# Patient Record
Sex: MALE | Race: WHITE | Employment: FULL TIME | URBAN - METROPOLITAN AREA
[De-identification: names, ages, dates, MRNs, and addresses within clinical notes are randomized per-mention and may not be internally consistent; named-entity substitution may affect disease eponyms.]

---

## 2017-04-26 ENCOUNTER — OFFICE VISIT (OUTPATIENT)
Dept: FAMILY MEDICINE CLINIC | Facility: CLINIC | Age: 42
End: 2017-04-26

## 2017-04-26 VITALS
DIASTOLIC BLOOD PRESSURE: 80 MMHG | SYSTOLIC BLOOD PRESSURE: 132 MMHG | RESPIRATION RATE: 16 BRPM | TEMPERATURE: 98 F | WEIGHT: 169.5 LBS | HEART RATE: 76 BPM

## 2017-04-26 DIAGNOSIS — H00.15 CHALAZION LEFT LOWER EYELID: Primary | ICD-10-CM

## 2017-04-26 PROBLEM — H00.19 CHALAZION: Status: ACTIVE | Noted: 2017-04-26

## 2017-04-26 PROCEDURE — 99213 OFFICE O/P EST LOW 20 MIN: CPT | Performed by: FAMILY MEDICINE

## 2017-04-26 RX ORDER — TOBRAMYCIN 3 MG/ML
1 SOLUTION/ DROPS OPHTHALMIC EVERY 4 HOURS
Qty: 1 BOTTLE | Refills: 0 | Status: SHIPPED | OUTPATIENT
Start: 2017-04-26 | End: 2018-07-13 | Stop reason: ALTCHOICE

## 2017-04-26 RX ORDER — MULTIVITAMIN
1 CAPSULE ORAL DAILY
COMMUNITY

## 2017-04-26 NOTE — PATIENT INSTRUCTIONS
Start eye drops. Ibuprofen 2 tabs three times per day for 3 -5 days.      Warm compresses twice daily

## 2017-04-27 NOTE — PROGRESS NOTES
Chief Complaint:   Patient presents with: Eye Visual Problem (opthalmic): Patient has a stye in his eye that has not gone away after 4 weeks.  Has tried eye drops didnt help      HPI:   This is a 42year old male coming in for pain in eye   Swelling in lef eye every 4 (four) hours. Patient Instructions   Start eye drops. Ibuprofen 2 tabs three times per day for 3 -5 days.      Warm compresses twice daily           Patient/Caregiver Education: Patient/Caregiver Education: There are no barriers to

## 2018-07-13 ENCOUNTER — OFFICE VISIT (OUTPATIENT)
Dept: FAMILY MEDICINE CLINIC | Facility: CLINIC | Age: 43
End: 2018-07-13

## 2018-07-13 VITALS
RESPIRATION RATE: 14 BRPM | HEART RATE: 72 BPM | DIASTOLIC BLOOD PRESSURE: 72 MMHG | TEMPERATURE: 99 F | HEIGHT: 71 IN | SYSTOLIC BLOOD PRESSURE: 108 MMHG | BODY MASS INDEX: 25.37 KG/M2 | WEIGHT: 181.19 LBS

## 2018-07-13 DIAGNOSIS — L02.416 ABSCESS OF LEFT LOWER LEG: Primary | ICD-10-CM

## 2018-07-13 PROCEDURE — 87147 CULTURE TYPE IMMUNOLOGIC: CPT | Performed by: NURSE PRACTITIONER

## 2018-07-13 PROCEDURE — 99214 OFFICE O/P EST MOD 30 MIN: CPT | Performed by: NURSE PRACTITIONER

## 2018-07-13 PROCEDURE — 87205 SMEAR GRAM STAIN: CPT | Performed by: NURSE PRACTITIONER

## 2018-07-13 PROCEDURE — 87186 SC STD MICRODIL/AGAR DIL: CPT | Performed by: NURSE PRACTITIONER

## 2018-07-13 PROCEDURE — 87070 CULTURE OTHR SPECIMN AEROBIC: CPT | Performed by: NURSE PRACTITIONER

## 2018-07-13 RX ORDER — CEPHALEXIN 500 MG/1
500 CAPSULE ORAL 3 TIMES DAILY
Qty: 30 CAPSULE | Refills: 0 | Status: SHIPPED | OUTPATIENT
Start: 2018-07-13 | End: 2018-07-23

## 2018-07-13 NOTE — PROGRESS NOTES
HPI:    Patient ID: Uziel Hernandez is a 36year old male. HPI     Patient is present with concern about a mole area to his left lower leg that he scratched and now is red and draining. States that it started swelling and becoming painful 2 days ago.  No Bacitracin topically.             #[id number]

## 2018-07-16 ENCOUNTER — TELEPHONE (OUTPATIENT)
Dept: FAMILY MEDICINE CLINIC | Facility: CLINIC | Age: 43
End: 2018-07-16

## 2018-07-16 NOTE — TELEPHONE ENCOUNTER
----- Message from JORY Holman sent at 7/15/2018  9:04 PM CDT -----  Patient is on an appropriate antibiotic. Please let him know and see how he is feeling. Thank you.

## 2018-07-18 NOTE — TELEPHONE ENCOUNTER
Patient informed of the below results. States he never started the antibiotic because within 24hrs the area started looking better. States right now the area is almost healed. Should patient start the antibiotic at this time? Please advise.

## 2018-07-19 NOTE — TELEPHONE ENCOUNTER
No need to start since doing better. Please let patient know. Thank you.  Chelsie Corley, 07/19/18, 7:12 AM

## 2019-05-16 ENCOUNTER — OFFICE VISIT (OUTPATIENT)
Dept: FAMILY MEDICINE CLINIC | Facility: CLINIC | Age: 44
End: 2019-05-16
Payer: COMMERCIAL

## 2019-05-16 VITALS
HEIGHT: 71 IN | TEMPERATURE: 98 F | HEART RATE: 64 BPM | BODY MASS INDEX: 25.23 KG/M2 | OXYGEN SATURATION: 97 % | WEIGHT: 180.19 LBS | DIASTOLIC BLOOD PRESSURE: 70 MMHG | SYSTOLIC BLOOD PRESSURE: 104 MMHG | RESPIRATION RATE: 18 BRPM

## 2019-05-16 DIAGNOSIS — G47.9 SLEEP DISTURBANCE: Primary | ICD-10-CM

## 2019-05-16 DIAGNOSIS — R06.83 SNORING: ICD-10-CM

## 2019-05-16 DIAGNOSIS — R53.83 FATIGUE, UNSPECIFIED TYPE: ICD-10-CM

## 2019-05-16 PROCEDURE — 99214 OFFICE O/P EST MOD 30 MIN: CPT | Performed by: NURSE PRACTITIONER

## 2019-05-16 NOTE — PROGRESS NOTES
[facility]        HPI:   This is a 39year old male coming in for Patient presents with:  Consult: sleep consult      HPI:     Patient is present for a sleep consult.  States that his wife encouraged him to be evaluat mouth daily. Disp:  Rfl:       Counseling given: Not Answered         Problem List:  Patient Active Problem List:     Chalazion     Anal fissure      REVIEW OF SYSTEMS:   Review of Systems   Constitutional: Positive for fatigue. HENT: Negative.     Eyes: place, and time. Skin: Skin is warm and dry. Psychiatric: He has a normal mood and affect. His behavior is normal. Judgment and thought content normal.   Nursing note and vitals reviewed. ASSESSMENT AND PLAN:   1.  Sleep disturbance  - OP REFERRAL

## 2019-05-16 NOTE — PATIENT INSTRUCTIONS
Scheduled for home sleep study 6/5/19. Review study with Chelsie on 6/19/19. Warned if still with sleep apnea and not using CPAP has 7 fold increased risk and heart attack, stroke, abnormal heart rhythm, and death.   Increased risk of driving accident

## 2019-06-14 ENCOUNTER — SLEEP STUDY (OUTPATIENT)
Dept: FAMILY MEDICINE CLINIC | Facility: CLINIC | Age: 44
End: 2019-06-14
Payer: COMMERCIAL

## 2019-06-14 DIAGNOSIS — G47.33 OBSTRUCTIVE SLEEP APNEA: Primary | ICD-10-CM

## 2019-06-14 PROCEDURE — 95806 SLEEP STUDY UNATT&RESP EFFT: CPT | Performed by: FAMILY MEDICINE

## 2019-06-18 ENCOUNTER — TELEPHONE (OUTPATIENT)
Dept: FAMILY MEDICINE CLINIC | Facility: CLINIC | Age: 44
End: 2019-06-18

## 2019-06-18 NOTE — TELEPHONE ENCOUNTER
Home sleep study received and entered into the flowsheet. Pt needs appointment to f/u to review study.

## 2019-06-18 NOTE — TELEPHONE ENCOUNTER
Pt has appt scheduled for tomorrow:  Future Appointments   Date Time Provider Lita Floyd   6/19/2019  9:00 AM Tesha Corley APRN [facility] [place]

## 2019-06-19 ENCOUNTER — OFFICE VISIT (OUTPATIENT)
Dept: FAMILY MEDICINE CLINIC | Facility: CLINIC | Age: 44
End: 2019-06-19
Payer: COMMERCIAL

## 2019-06-19 VITALS
HEIGHT: 71 IN | TEMPERATURE: 98 F | WEIGHT: 178.19 LBS | RESPIRATION RATE: 16 BRPM | HEART RATE: 68 BPM | BODY MASS INDEX: 24.95 KG/M2 | SYSTOLIC BLOOD PRESSURE: 112 MMHG | OXYGEN SATURATION: 98 % | DIASTOLIC BLOOD PRESSURE: 62 MMHG

## 2019-06-19 DIAGNOSIS — G47.33 OSA (OBSTRUCTIVE SLEEP APNEA): Primary | ICD-10-CM

## 2019-06-19 DIAGNOSIS — R06.83 SNORING: ICD-10-CM

## 2019-06-19 PROCEDURE — 99214 OFFICE O/P EST MOD 30 MIN: CPT | Performed by: NURSE PRACTITIONER

## 2019-06-19 NOTE — PATIENT INSTRUCTIONS
Get machine from [facility].      Follow-up about 2 weeks after using the machine at home with Dr. Jurgen Tyson or Divina Fischer if still with sleep apnea and not using CPAP has a  7 fold increase in risk of heart attack, stroke, abnormal heart rhythm  and death,

## 2019-06-21 NOTE — PROGRESS NOTES
[facility]        HPI:   This is a 39year old male coming in for Patient presents with: Follow - Up: Sleep study      HPI:     Patient is present to review his home sleep study.   Informed that the study did show mi Alcohol/week: 0.0 oz      Comment: 2 Drinks a month    Drug use: No    Family History:  No family history on file.   Allergies:  No Known Allergies  Current Meds:    Current Outpatient Medications:  Multiple Vitamin (MULTIVITAMINS) Oral Cap Take 1 capsule b AND PLAN:   1. ADRIANNE (obstructive sleep apnea)  - EXT DME CPAP    2. Snoring  - EXT DME CPAP    Patient Instructions   Get machine from [facility].      Follow-up about 2 weeks after using the machine at home with Dr. Lizzy Slade or [address] [place] [address] if still with sleep Parent is to call with any side effects or complications from the treatments as a result of today.        [address] Olya Ortega, JONH  6/21/2019  3:24 PM

## 2019-07-10 ENCOUNTER — TELEPHONE (OUTPATIENT)
Dept: FAMILY MEDICINE CLINIC | Facility: CLINIC | Age: 44
End: 2019-07-10

## 2019-07-10 NOTE — TELEPHONE ENCOUNTER
Fax received from [facility] with notification that patient did start CPAP on 7/5/19. Pt will need f/u appointment 2-3 weeks after using machine regularly.

## 2019-07-24 NOTE — TELEPHONE ENCOUNTER
Your appointments     Date & Time Appointment Department [facility])    Jul 30, 2019 10:00 AM CDT Sleep Follow Up with Jessica Morrissey, [address], [facility] (Grant Pruett [address] [place] [address]

## 2019-07-30 ENCOUNTER — OFFICE VISIT (OUTPATIENT)
Dept: FAMILY MEDICINE CLINIC | Facility: CLINIC | Age: 44
End: 2019-07-30
Payer: COMMERCIAL

## 2019-07-30 VITALS
SYSTOLIC BLOOD PRESSURE: 116 MMHG | HEART RATE: 70 BPM | OXYGEN SATURATION: 98 % | HEIGHT: 71 IN | BODY MASS INDEX: 25.51 KG/M2 | RESPIRATION RATE: 16 BRPM | TEMPERATURE: 98 F | DIASTOLIC BLOOD PRESSURE: 78 MMHG | WEIGHT: 182.19 LBS

## 2019-07-30 DIAGNOSIS — G47.33 OSA (OBSTRUCTIVE SLEEP APNEA): Primary | ICD-10-CM

## 2019-07-30 PROCEDURE — 99214 OFFICE O/P EST MOD 30 MIN: CPT | Performed by: NURSE PRACTITIONER

## 2019-07-30 NOTE — PATIENT INSTRUCTIONS
Continue sleep therapy. Decrease max pressure to 12. Contact [facility] regarding the on/off switch. Follow-up in 4-6 weeks - sooner if needed.      Advised if still with sleep apnea and not using CPAP has a  7 fold increase in risk of heart attack, stro

## 2019-07-30 NOTE — PROGRESS NOTES
[facility]  [facility]        HPI:   This is a 39year old male coming in for Patient presents with:  Obstructive Sleep Apnea (ADRIANNE): start of CPAP f/u      HPI:     Present for follow-up on sleep therapy. Feeling more rested. Smoker      Smokeless tobacco: Never Used    Alcohol use: Yes      Alcohol/week: 0.0 standard drinks      Comment: 2 Drinks a month    Drug use: No    Family History:  No family history on file.   Allergies:  No Known Allergies  Current Meds:    Current Out Cardiovascular: Normal rate and regular rhythm. Exam reveals no friction rub. No murmur heard. Pulmonary/Chest: Effort normal and breath sounds normal. No respiratory distress. Musculoskeletal: Normal range of motion.    Lymphadenopathy:     He has n filters,masks,hoses  and tubes and equiptment on a  regular schedule. Filters and seals shall be changed every 1 month,  Hoses every 3 months,   CPAP mask and humidifier  chamber changed every 6 month  with the Durable medical equipment provider.

## 2019-09-19 ENCOUNTER — OFFICE VISIT (OUTPATIENT)
Dept: FAMILY MEDICINE CLINIC | Facility: CLINIC | Age: 44
End: 2019-09-19
Payer: COMMERCIAL

## 2019-09-19 VITALS
TEMPERATURE: 97 F | WEIGHT: 179.38 LBS | HEIGHT: 71 IN | SYSTOLIC BLOOD PRESSURE: 104 MMHG | HEART RATE: 64 BPM | RESPIRATION RATE: 14 BRPM | BODY MASS INDEX: 25.11 KG/M2 | DIASTOLIC BLOOD PRESSURE: 64 MMHG | OXYGEN SATURATION: 98 %

## 2019-09-19 DIAGNOSIS — G47.33 OSA (OBSTRUCTIVE SLEEP APNEA): Primary | ICD-10-CM

## 2019-09-19 PROCEDURE — 99213 OFFICE O/P EST LOW 20 MIN: CPT | Performed by: NURSE PRACTITIONER

## 2019-09-19 NOTE — PROGRESS NOTES
[facility]        HPI:   This is a 39year old male coming in for Patient presents with:  Obstructive Sleep Apnea (ADRIANNE): f/u from 7/30/19      HPI:     Got a new machine from [facility] and states that it seems good now Tobacco Use      Smoking status: Never Smoker      Smokeless tobacco: Never Used    Alcohol use: Yes      Alcohol/week: 0.0 standard drinks      Comment: 2 Drinks a month    Drug use: No    Family History:  No family history on file.   Allergies:  No Known supple. No thyromegaly present. Cardiovascular: Normal rate and regular rhythm. Exam reveals no friction rub. No murmur heard. Pulmonary/Chest: Effort normal and breath sounds normal. No respiratory distress. Musculoskeletal: Normal range of motion. equiptment on a  regular schedule. Filters and seals shall be changed every 1 month,  Hoses every 3 months,   CPAP mask and humidifier  chamber changed every 6 month  with the Durable medical equipment provider.          Meds & Refills for this Visit:  Lauren Veras

## 2020-03-09 ENCOUNTER — TELEPHONE (OUTPATIENT)
Dept: FAMILY MEDICINE CLINIC | Facility: CLINIC | Age: 45
End: 2020-03-09

## 2020-03-09 NOTE — TELEPHONE ENCOUNTER
Spoke with patient's wife Ria. Ria states that patient has been having nausea, vomiting, chills, and abdominal pain since yesterday. States he had similar symptoms about two weeks ago.  Patient assumed he had caught a stomach bug and the symptoms re

## 2020-05-01 ENCOUNTER — VIRTUAL PHONE E/M (OUTPATIENT)
Dept: FAMILY MEDICINE CLINIC | Facility: CLINIC | Age: 45
End: 2020-05-01
Payer: COMMERCIAL

## 2020-05-01 VITALS — BODY MASS INDEX: 24 KG/M2 | WEIGHT: 172 LBS

## 2020-05-01 DIAGNOSIS — G47.33 OSA (OBSTRUCTIVE SLEEP APNEA): Primary | ICD-10-CM

## 2020-05-01 PROCEDURE — 99213 OFFICE O/P EST LOW 20 MIN: CPT | Performed by: NURSE PRACTITIONER

## 2020-05-01 NOTE — PATIENT INSTRUCTIONS
Increase time of using during the night. Continue sleep therapy. Follow-up in 6 months - sooner if needed.      Advised if still with sleep apnea and not using CPAP has a  7 fold increase in risk of heart attack, stroke, abnormal heart rhythm  and death

## 2020-05-01 NOTE — PROGRESS NOTES
[facility]  SLEEP PROGRESS NOTE    Etienne Farley  verbally consents to a Virtual/Telephone Check-In service on 5/1/2020.     Patient understands and accepts financial responsibility for any deductible, co-insurance and/or co-pays associ you stop breathing during your sleep? - -   Do you have or are you being treated for high blood pressure? - -   BMI more than 35kg/mg2? - -   Age over 48years old? - -   Neck circumference >16 inches (40 cm)?  - -   Gender Male? - -   Score and ADRIANNE Risk - 3.2 oz (82.2 kg)    BP Readings from Last 3 Encounters:  09/19/19 : 104/64  07/30/19 : 116/78  06/19/19 : 112/62        Vital signs reviewed. Physical Exam   Constitutional: He is oriented to person, place, and time. No distress.    Pulmonary/Chest: Effort month,  Hoses every 3 months,   CPAP mask and humidifier  chamber changed every 6 month  with the Durable medical equipment provider.          Meds & Refills for this Visit:  Requested Prescriptions      No prescriptions requested or ordered in this encount

## 2020-12-16 ENCOUNTER — TELEMEDICINE (OUTPATIENT)
Dept: FAMILY MEDICINE CLINIC | Facility: CLINIC | Age: 45
End: 2020-12-16

## 2020-12-16 DIAGNOSIS — R51.9 GENERALIZED HEADACHES: Primary | ICD-10-CM

## 2020-12-16 DIAGNOSIS — R50.9 FEVER, UNSPECIFIED FEVER CAUSE: ICD-10-CM

## 2020-12-16 DIAGNOSIS — R52 GENERALIZED BODY ACHES: ICD-10-CM

## 2020-12-16 PROCEDURE — 99213 OFFICE O/P EST LOW 20 MIN: CPT | Performed by: NURSE PRACTITIONER

## 2020-12-16 NOTE — PATIENT INSTRUCTIONS
Get Covid testing through [facility]. Patient is aware it can take 48 to 72 hours to get results. Treat symptoms as needed. Recommend to take vitamin D 2000 IU, vitamin C, and zinc up to 50 mg daily.     If increase in shortness of breath or diffic  that contains at least 60% alcohol. 8. As much as possible, stay in a specific room and away from other people in your home. Also, you should use a separate bathroom, if available.  If you need to be around other people in or outside of the home

## 2020-12-16 NOTE — PROGRESS NOTES
Visit for Respiratory Illness - Potential COVID-19 Infection    This visit is conducted using Telemedicine with live, interactive video and audio.     SUBJECTIVE    Chief Complaint:  Concern for respiratory illness (including COVID-19 and influenza)    HPI: vitamin D 2000 IU, vitamin C, and zinc up to 50 mg daily. If increase in shortness of breath or difficulty breathing, go to the ER. Quarantine until get test results.     Coronavirus Disease 2019 (COVID-19)     Azael Del Real is committed to t use a separate bathroom, if available. If you need to be around other people in or outside of the home, wear a facemask. 9. Avoid sharing personal items with other people in your household, like dishes, towels, and bedding   10.  Clean all surfaces that a this note.

## 2020-12-17 ENCOUNTER — TELEPHONE (OUTPATIENT)
Dept: FAMILY MEDICINE CLINIC | Facility: CLINIC | Age: 45
End: 2020-12-17

## 2020-12-17 NOTE — TELEPHONE ENCOUNTER
Talk to patient and let him know that his Covid test came back positive. He is aware and the need to quarantine for at least 10 days from the onset of the symptoms as well as be fever free. States an understanding.   Will go to the ER if he has any shortn

## 2021-10-25 ENCOUNTER — TELEPHONE (OUTPATIENT)
Dept: FAMILY MEDICINE CLINIC | Facility: CLINIC | Age: 46
End: 2021-10-25

## 2021-10-25 NOTE — TELEPHONE ENCOUNTER
patient needs supplies for his cpap machine. Asking if he needs an appointment? No future appointments.

## 2021-10-25 NOTE — TELEPHONE ENCOUNTER
Yes, due for sleep follow up  Scheduled appt with patient.   Future Appointments   Date Time Provider Lita Floyd   10/28/2021  4:30 PM Shyam Corley APRN [facility] [place]

## 2021-11-24 ENCOUNTER — OFFICE VISIT (OUTPATIENT)
Dept: FAMILY MEDICINE CLINIC | Facility: CLINIC | Age: 46
End: 2021-11-24
Payer: COMMERCIAL

## 2021-11-24 VITALS
HEIGHT: 71 IN | DIASTOLIC BLOOD PRESSURE: 70 MMHG | OXYGEN SATURATION: 97 % | HEART RATE: 73 BPM | RESPIRATION RATE: 18 BRPM | BODY MASS INDEX: 23.52 KG/M2 | WEIGHT: 168 LBS | SYSTOLIC BLOOD PRESSURE: 110 MMHG | TEMPERATURE: 97 F

## 2021-11-24 DIAGNOSIS — G47.33 OSA (OBSTRUCTIVE SLEEP APNEA): Primary | ICD-10-CM

## 2021-11-24 PROCEDURE — 3078F DIAST BP <80 MM HG: CPT | Performed by: NURSE PRACTITIONER

## 2021-11-24 PROCEDURE — 3008F BODY MASS INDEX DOCD: CPT | Performed by: NURSE PRACTITIONER

## 2021-11-24 PROCEDURE — 3074F SYST BP LT 130 MM HG: CPT | Performed by: NURSE PRACTITIONER

## 2021-11-24 PROCEDURE — 99214 OFFICE O/P EST MOD 30 MIN: CPT | Performed by: NURSE PRACTITIONER

## 2021-11-24 NOTE — PROGRESS NOTES
[facility]        HPI:   This is a 54year old male coming in for Patient presents with:  Obstructive Sleep Apnea (ADRIANNE): Sleep follow up      HPI:     Patient is present for sleep therapy follow up.  States that he ha file    Social History    Tobacco Use      Smoking status: Never Smoker      Smokeless tobacco: Never Used    Alcohol use: Yes      Alcohol/week: 0.0 standard drinks      Comment: 2 Drinks a month    Drug use: No    Family History:  History reviewed.  No pe external ear normal.      Nose: Nose normal.      Mouth/Throat:      Mouth: Mucous membranes are moist.      Comments: Mal 2 Tonsils 0  Eyes:      Conjunctiva/sclera: Conjunctivae normal.   Neck:      Thyroid: No thyromegaly.    Cardiovascular:      Rate an fold increase in risk of heart attack, stroke, abnormal heart rhythm  and death,  increased risk of driving accidents. Advised to refrain from driving when sleepy. COMPLIANCE is required by insurance for 4 hours a night most nights of the week.   Recom

## 2024-07-30 ENCOUNTER — APPOINTMENT (RX ONLY)
Dept: URBAN - METROPOLITAN AREA CLINIC 114 | Facility: CLINIC | Age: 49
Setting detail: DERMATOLOGY
End: 2024-07-30

## 2024-07-30 DIAGNOSIS — D18.0 HEMANGIOMA: ICD-10-CM

## 2024-07-30 DIAGNOSIS — D22 MELANOCYTIC NEVI: ICD-10-CM

## 2024-07-30 DIAGNOSIS — Z80.8 FAMILY HISTORY OF MALIGNANT NEOPLASM OF OTHER ORGANS OR SYSTEMS: ICD-10-CM

## 2024-07-30 DIAGNOSIS — L57.8 OTHER SKIN CHANGES DUE TO CHRONIC EXPOSURE TO NONIONIZING RADIATION: ICD-10-CM | Status: INADEQUATELY CONTROLLED

## 2024-07-30 DIAGNOSIS — L81.4 OTHER MELANIN HYPERPIGMENTATION: ICD-10-CM

## 2024-07-30 DIAGNOSIS — L85.3 XEROSIS CUTIS: ICD-10-CM

## 2024-07-30 DIAGNOSIS — L82.1 OTHER SEBORRHEIC KERATOSIS: ICD-10-CM

## 2024-07-30 DIAGNOSIS — D49.2 NEOPLASM OF UNSPECIFIED BEHAVIOR OF BONE, SOFT TISSUE, AND SKIN: ICD-10-CM

## 2024-07-30 PROBLEM — D22.5 MELANOCYTIC NEVI OF TRUNK: Status: ACTIVE | Noted: 2024-07-30

## 2024-07-30 PROBLEM — D18.01 HEMANGIOMA OF SKIN AND SUBCUTANEOUS TISSUE: Status: ACTIVE | Noted: 2024-07-30

## 2024-07-30 PROCEDURE — ? TREATMENT REGIMEN

## 2024-07-30 PROCEDURE — ? BIOPSY BY SHAVE METHOD

## 2024-07-30 PROCEDURE — 11102 TANGNTL BX SKIN SINGLE LES: CPT

## 2024-07-30 PROCEDURE — ? COUNSELING

## 2024-07-30 PROCEDURE — ? PHOTO-DOCUMENTATION

## 2024-07-30 PROCEDURE — 99203 OFFICE O/P NEW LOW 30 MIN: CPT | Mod: 25

## 2024-07-30 PROCEDURE — 11103 TANGNTL BX SKIN EA SEP/ADDL: CPT

## 2024-07-30 ASSESSMENT — LOCATION DETAILED DESCRIPTION DERM
LOCATION DETAILED: RIGHT PROXIMAL PRETIBIAL REGION
LOCATION DETAILED: LEFT LATERAL TRAPEZIAL NECK
LOCATION DETAILED: RIGHT SUPERIOR UPPER BACK
LOCATION DETAILED: RIGHT CLAVICULAR NECK
LOCATION DETAILED: EPIGASTRIC SKIN
LOCATION DETAILED: LEFT MEDIAL UPPER BACK
LOCATION DETAILED: RIGHT ANTERIOR SHOULDER
LOCATION DETAILED: RIGHT POSTERIOR SHOULDER
LOCATION DETAILED: RIGHT ANTERIOR PROXIMAL THIGH
LOCATION DETAILED: LEFT PROXIMAL PRETIBIAL REGION
LOCATION DETAILED: LEFT MID-UPPER BACK

## 2024-07-30 ASSESSMENT — LOCATION ZONE DERM
LOCATION ZONE: LEG
LOCATION ZONE: TRUNK
LOCATION ZONE: ARM
LOCATION ZONE: NECK

## 2024-07-30 ASSESSMENT — LOCATION SIMPLE DESCRIPTION DERM
LOCATION SIMPLE: RIGHT ANTERIOR NECK
LOCATION SIMPLE: RIGHT THIGH
LOCATION SIMPLE: RIGHT UPPER BACK
LOCATION SIMPLE: LEFT UPPER BACK
LOCATION SIMPLE: RIGHT SHOULDER
LOCATION SIMPLE: ABDOMEN
LOCATION SIMPLE: LEFT PRETIBIAL REGION
LOCATION SIMPLE: RIGHT PRETIBIAL REGION
LOCATION SIMPLE: POSTERIOR NECK

## 2024-07-30 NOTE — PROCEDURE: BIOPSY BY SHAVE METHOD
Detail Level: Detailed
Depth Of Biopsy: dermis
Was A Bandage Applied: Yes
Size Of Lesion In Cm: 0.6
X Size Of Lesion In Cm: 0
Biopsy Type: H and E
Biopsy Method: Personna blade
Anesthesia Type: 1% lidocaine with epinephrine
Anesthesia Volume In Cc: 1
Hemostasis: Pressure
Wound Care: No ointment
Dressing: no dressing applied
Destruction After The Procedure: No
Type Of Destruction Used: Curettage
Curettage Text: The wound bed was treated with curettage after the biopsy was performed.
Cryotherapy Text: The wound bed was treated with cryotherapy after the biopsy was performed.
Electrodesiccation Text: The wound bed was treated with electrodesiccation after the biopsy was performed.
Electrodesiccation And Curettage Text: The wound bed was treated with electrodesiccation and curettage after the biopsy was performed.
Silver Nitrate Text: The wound bed was treated with silver nitrate after the biopsy was performed.
Lab: -[id number]
Consent: Written consent was obtained and risks were reviewed including but not limited to scarring, infection, bleeding, scabbing, incomplete removal, nerve damage and allergy to anesthesia.
Post-Care Instructions: I reviewed with the patient in detail post-care instructions. Patient is to keep the biopsy site dry overnight, and then apply bacitracin twice daily until healed. Patient may apply hydrogen peroxide soaks to remove any crusting.
Notification Instructions: Patient will be notified of biopsy results. However, patient instructed to call the office if not contacted within 2 weeks.
Billing Type: Third-Party Bill
Information: Selecting Yes will display possible errors in your note based on the variables you have selected. This validation is only offered as a suggestion for you. PLEASE NOTE THAT THE VALIDATION TEXT WILL BE REMOVED WHEN YOU FINALIZE YOUR NOTE. IF YOU WANT TO FAX A PRELIMINARY NOTE YOU WILL NEED TO TOGGLE THIS TO 'NO' IF YOU DO NOT WANT IT IN YOUR FAXED NOTE.
Size Of Lesion In Cm: 0.5
Lab: -[id number]
Lab Facility: [id number]

## (undated) NOTE — MR AVS SNAPSHOT
SANDY Adkins, [address]ef[address] [id number]  [phone]               Thank you for choosing us for your health care visit with Franko Ley MD.  We are glad to serve you and happy to provide you with this summary of your Zip Code and Date of Birth to complete the sign-up process. If you do not sign up before the expiration date, you must request a new code.     Your unique [employer] Access Code: [email or web address]  Expires: 6/25/2017  2:14 PM    If you have questions, you can c

## (undated) NOTE — LETTER
1956 [address] [address]  [phone]            July 18, 2018      Serafin Hinton      Dear Pablo Arce:    Our office has been trying to contact you